# Patient Record
Sex: FEMALE | Race: WHITE | URBAN - METROPOLITAN AREA
[De-identification: names, ages, dates, MRNs, and addresses within clinical notes are randomized per-mention and may not be internally consistent; named-entity substitution may affect disease eponyms.]

---

## 2019-08-28 ENCOUNTER — OFFICE VISIT (OUTPATIENT)
Dept: URBAN - METROPOLITAN AREA CLINIC 48 | Facility: CLINIC | Age: 84
End: 2019-08-28
Payer: MEDICARE

## 2019-08-28 PROCEDURE — 92083 EXTENDED VISUAL FIELD XM: CPT | Performed by: OPHTHALMOLOGY

## 2019-08-28 PROCEDURE — 76514 ECHO EXAM OF EYE THICKNESS: CPT | Performed by: OPHTHALMOLOGY

## 2019-08-28 PROCEDURE — 92020 GONIOSCOPY: CPT | Performed by: OPHTHALMOLOGY

## 2019-08-28 PROCEDURE — 92133 CPTRZD OPH DX IMG PST SGM ON: CPT | Performed by: OPHTHALMOLOGY

## 2019-08-28 PROCEDURE — 92014 COMPRE OPH EXAM EST PT 1/>: CPT | Performed by: OPHTHALMOLOGY

## 2019-08-28 RX ORDER — TRAVOPROST 0.04 MG/ML
0.004 % SOLUTION/ DROPS OPHTHALMIC
Qty: 2.5 | Refills: 7 | Status: INACTIVE
Start: 2019-08-28 | End: 2020-02-13

## 2019-08-28 ASSESSMENT — INTRAOCULAR PRESSURE
OD: 20
OS: 19

## 2019-08-28 NOTE — IMPRESSION/PLAN
Impression: Primary open-angle glaucoma, right eye, mild stage: H40.1111. Plan: Discussed and reviewed diagnosis with patient today, understood by patient, discussed reviewed VF and OCT with patient today, intraocular pressure is not stable with medication. Continue medications and observe. Importance of compliance with medications and regular follow-up reiterated, will continue to monitor. Patient to restart Travatan z qhs od only. no drops recommended to os.

## 2020-02-13 ENCOUNTER — OFFICE VISIT (OUTPATIENT)
Dept: URBAN - METROPOLITAN AREA CLINIC 48 | Facility: CLINIC | Age: 85
End: 2020-02-13
Payer: MEDICARE

## 2020-02-13 DIAGNOSIS — H40.1111 PRIMARY OPEN-ANGLE GLAUCOMA, RIGHT EYE, MILD STAGE: Primary | ICD-10-CM

## 2020-02-13 PROCEDURE — 92012 INTRM OPH EXAM EST PATIENT: CPT | Performed by: OPHTHALMOLOGY

## 2020-02-13 RX ORDER — TRAVOPROST 0.04 MG/ML
0.004 % SOLUTION/ DROPS OPHTHALMIC
Qty: 2.5 | Refills: 7 | Status: INACTIVE
Start: 2020-02-13 | End: 2020-12-22

## 2020-02-13 ASSESSMENT — INTRAOCULAR PRESSURE
OS: 21
OD: 20

## 2020-02-13 NOTE — IMPRESSION/PLAN
Impression: Primary open-angle glaucoma, right eye, mild stage: H40.1111. Plan: Discussed and reviewed diagnosis with patient today, understood by patient, intraocular pressure stable with medication. Continue medications and observe. Importance of compliance with medications and regular follow-up reiterated, will continue to monitor.  Patient to continue Travatan Z QHS OD

## 2020-11-02 ENCOUNTER — OFFICE VISIT (OUTPATIENT)
Dept: URBAN - METROPOLITAN AREA CLINIC 48 | Facility: CLINIC | Age: 85
End: 2020-11-02
Payer: MEDICARE

## 2020-11-02 PROCEDURE — 92083 EXTENDED VISUAL FIELD XM: CPT | Performed by: OPHTHALMOLOGY

## 2020-11-02 PROCEDURE — 92014 COMPRE OPH EXAM EST PT 1/>: CPT | Performed by: OPHTHALMOLOGY

## 2020-11-02 PROCEDURE — 92133 CPTRZD OPH DX IMG PST SGM ON: CPT | Performed by: OPHTHALMOLOGY

## 2020-11-02 ASSESSMENT — INTRAOCULAR PRESSURE
OD: 19
OS: 22

## 2020-11-02 NOTE — ASSESSMENT/PLAN
Impression: Visual Field - OD: Fair-possible nasal step; OS: Fair-no glaucomatous visual field defects Plan: see plan

## 2020-11-02 NOTE — IMPRESSION/PLAN
Impression: Primary open-angle glaucoma, right eye, mild stage: H40.1111. Plan: Discussed and reviewed diagnosis with patient today, understood by patient, discussed reviewed VF and OCT with patient today, intraocular pressure stable with medication. Continue medications and observe. Importance of compliance with medications and regular follow-up reiterated, will continue to monitor.  Patient to continue Travatan QHS OD